# Patient Record
Sex: FEMALE | Race: BLACK OR AFRICAN AMERICAN | NOT HISPANIC OR LATINO | Employment: FULL TIME | URBAN - METROPOLITAN AREA
[De-identification: names, ages, dates, MRNs, and addresses within clinical notes are randomized per-mention and may not be internally consistent; named-entity substitution may affect disease eponyms.]

---

## 2017-02-03 ENCOUNTER — HOSPITAL ENCOUNTER (OUTPATIENT)
Dept: RADIOLOGY | Facility: HOSPITAL | Age: 42
Discharge: HOME | End: 2017-02-03
Attending: INTERNAL MEDICINE
Payer: MEDICAID

## 2017-02-03 DIAGNOSIS — Z12.31 VISIT FOR SCREENING MAMMOGRAM: ICD-10-CM

## 2017-02-03 PROCEDURE — 77067 SCR MAMMO BI INCL CAD: CPT | Mod: TC

## 2017-11-29 ENCOUNTER — HOSPITAL ENCOUNTER (EMERGENCY)
Facility: HOSPITAL | Age: 42
Discharge: HOME | End: 2017-11-30
Attending: EMERGENCY MEDICINE
Payer: MEDICAID
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
## 2017-11-29 PROCEDURE — 99283 EMERGENCY DEPT VISIT LOW MDM: CPT

## 2017-11-30 VITALS
BODY MASS INDEX: 31.64 KG/M2 | DIASTOLIC BLOOD PRESSURE: 72 MMHG | OXYGEN SATURATION: 99 % | WEIGHT: 202 LBS | TEMPERATURE: 98 F | RESPIRATION RATE: 20 BRPM | HEART RATE: 78 BPM | SYSTOLIC BLOOD PRESSURE: 118 MMHG

## 2017-11-30 PROCEDURE — 25000003 PHARM REV CODE 250: Performed by: EMERGENCY MEDICINE

## 2017-11-30 RX ORDER — CYCLOBENZAPRINE HCL 10 MG
10 TABLET ORAL
Status: COMPLETED | OUTPATIENT
Start: 2017-11-30 | End: 2017-11-30

## 2017-11-30 RX ORDER — CYCLOBENZAPRINE HCL 10 MG
10 TABLET ORAL 3 TIMES DAILY PRN
Qty: 15 TABLET | Refills: 0 | Status: SHIPPED | OUTPATIENT
Start: 2017-11-30 | End: 2017-12-05

## 2017-11-30 RX ORDER — HYDROCODONE BITARTRATE AND ACETAMINOPHEN 5; 325 MG/1; MG/1
1 TABLET ORAL
Status: COMPLETED | OUTPATIENT
Start: 2017-11-30 | End: 2017-11-30

## 2017-11-30 RX ORDER — HYDROCODONE BITARTRATE AND ACETAMINOPHEN 5; 325 MG/1; MG/1
1 TABLET ORAL EVERY 4 HOURS PRN
Qty: 18 TABLET | Refills: 0 | Status: SHIPPED | OUTPATIENT
Start: 2017-11-30 | End: 2022-06-14

## 2017-11-30 RX ADMIN — CYCLOBENZAPRINE HYDROCHLORIDE 10 MG: 10 TABLET, FILM COATED ORAL at 12:11

## 2017-11-30 RX ADMIN — HYDROCODONE BITARTRATE AND ACETAMINOPHEN 1 TABLET: 5; 325 TABLET ORAL at 12:11
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
## 2018-12-17 ENCOUNTER — HOSPITAL ENCOUNTER (OUTPATIENT)
Dept: RADIOLOGY | Facility: HOSPITAL | Age: 43
Discharge: HOME | End: 2018-12-17
Attending: NURSE PRACTITIONER
Payer: MEDICAID

## 2018-12-17 DIAGNOSIS — M54.2 CERVICALGIA: Primary | ICD-10-CM

## 2018-12-17 DIAGNOSIS — M54.2 CERVICALGIA: ICD-10-CM

## 2018-12-17 PROCEDURE — 70360 X-RAY EXAM OF NECK: CPT | Mod: TC,FY,PO

## 2020-08-09 ENCOUNTER — HOSPITAL ENCOUNTER (EMERGENCY)
Facility: HOSPITAL | Age: 45
Discharge: HOME | End: 2020-08-09
Attending: EMERGENCY MEDICINE

## 2020-08-09 VITALS
HEART RATE: 76 BPM | OXYGEN SATURATION: 98 % | DIASTOLIC BLOOD PRESSURE: 71 MMHG | TEMPERATURE: 99 F | SYSTOLIC BLOOD PRESSURE: 124 MMHG | HEIGHT: 64 IN | BODY MASS INDEX: 40.36 KG/M2 | RESPIRATION RATE: 18 BRPM | WEIGHT: 236.38 LBS

## 2020-08-09 DIAGNOSIS — M79.673 FOOT PAIN: ICD-10-CM

## 2020-08-09 DIAGNOSIS — M25.579 ANKLE PAIN: ICD-10-CM

## 2020-08-09 DIAGNOSIS — S93.401A SPRAIN OF RIGHT ANKLE, UNSPECIFIED LIGAMENT, INITIAL ENCOUNTER: Primary | ICD-10-CM

## 2020-08-09 PROCEDURE — 96372 THER/PROPH/DIAG INJ SC/IM: CPT | Mod: ER

## 2020-08-09 PROCEDURE — 99284 EMERGENCY DEPT VISIT MOD MDM: CPT | Mod: 25,ER

## 2020-08-09 PROCEDURE — 63600175 PHARM REV CODE 636 W HCPCS: Mod: ER | Performed by: PHYSICIAN ASSISTANT

## 2020-08-09 RX ORDER — KETOROLAC TROMETHAMINE 10 MG/1
10 TABLET, FILM COATED ORAL EVERY 6 HOURS
Qty: 10 TABLET | Refills: 0 | Status: SHIPPED | OUTPATIENT
Start: 2020-08-09 | End: 2022-06-14

## 2020-08-09 RX ORDER — KETOROLAC TROMETHAMINE 30 MG/ML
30 INJECTION, SOLUTION INTRAMUSCULAR; INTRAVENOUS
Status: COMPLETED | OUTPATIENT
Start: 2020-08-09 | End: 2020-08-09

## 2020-08-09 RX ADMIN — KETOROLAC TROMETHAMINE 30 MG: 30 INJECTION, SOLUTION INTRAMUSCULAR at 01:08

## 2020-08-09 NOTE — DISCHARGE INSTRUCTIONS
Your x-rays did not reveal any evidence of fracture dislocation.  Your advised to rest, elevate and ice the ankle and foot.  You are instructed to follow-up with her primary care provider for re-evaluation and to return to the emergency department immediately for any new or worsening symptoms.

## 2020-08-09 NOTE — ED PROVIDER NOTES
"Encounter Date: 2020       History     Chief Complaint   Patient presents with    Ankle Pain     Pt states "I was trying to be cute and clean up. I slipped and hurt my right ankle. It's throbbing." No meds.      45-year-old female presents to the emergency department for evaluation of acute right ankle and foot pain status post injury.  She reports that she was wearing wedge shoes when she accidentally inverted her ankle.  She reports that this happened just prior to arrival.  She reports that it is an achy, throbbing pain in her ankle and lateral aspect of her foot.  She denies any numbness, tingling, weakness or swelling to the lower extremities.  She denies any radiation of pain to her knee, hip or low back.  She reports that she did not hit her head nor lose consciousness when she fell.  No treatment was attempted prior to arrival.  She reports that her menstrual cycle started today, she denies risk of pregnancy.  She denies taking any blood thinning medications.        Review of patient's allergies indicates:   Allergen Reactions    Morphine      Past Medical History:   Diagnosis Date    Anxiety      Past Surgical History:   Procedure Laterality Date     SECTION      gastric sleeve       TUBAL LIGATION       History reviewed. No pertinent family history.  Social History     Tobacco Use    Smoking status: Never Smoker   Substance Use Topics    Alcohol use: Yes     Comment: socially    Drug use: Not on file     Review of Systems   Constitutional: Negative for activity change, appetite change and fever.   HENT: Negative for congestion, ear pain, sinus pain, sore throat, trouble swallowing and voice change.    Eyes: Negative for photophobia and visual disturbance.   Respiratory: Negative for cough, chest tightness, shortness of breath and wheezing.    Cardiovascular: Negative for chest pain.   Gastrointestinal: Negative for abdominal pain, constipation, diarrhea, nausea and vomiting. "   Genitourinary: Negative for decreased urine volume, dysuria, flank pain and frequency.   Musculoskeletal: Positive for arthralgias. Negative for back pain, joint swelling, neck pain and neck stiffness.   Neurological: Negative for dizziness, syncope, weakness, light-headedness, numbness and headaches.       Physical Exam     Initial Vitals [08/09/20 1328]   BP Pulse Resp Temp SpO2   128/73 99 19 98.5 °F (36.9 °C) 99 %      MAP       --         Physical Exam    Nursing note and vitals reviewed.  Constitutional: She appears well-developed and well-nourished. She is not diaphoretic. No distress.   HENT:   Head: Normocephalic and atraumatic.   Right Ear: External ear normal.   Left Ear: External ear normal.   Nose: Nose normal.   Mouth/Throat: Oropharynx is clear and moist.   Eyes: Conjunctivae and EOM are normal. Pupils are equal, round, and reactive to light.   Neck: Normal range of motion. Neck supple.   Cardiovascular: Normal rate, regular rhythm and normal heart sounds.   Pulmonary/Chest: Breath sounds normal. No respiratory distress. She has no wheezes. She has no rhonchi. She has no rales. She exhibits no tenderness.   Musculoskeletal:      Right knee: She exhibits normal range of motion, no swelling and no effusion. No tenderness found.        Right ankle: She exhibits normal range of motion, no swelling and no ecchymosis. Tenderness. Lateral malleolus tenderness found. No medial malleolus tenderness found.      Right lower leg: She exhibits no tenderness, no bony tenderness and no swelling.        Right foot: Tenderness and bony tenderness present. No swelling or laceration.   Lymphadenopathy:     She has no cervical adenopathy.   Neurological: She is alert and oriented to person, place, and time.   Skin: Skin is warm and dry.   Psychiatric: She has a normal mood and affect.         ED Course   Procedures  Labs Reviewed - No data to display       Imaging Results    None          Medical Decision Making:    Initial Assessment:   45-year-old female presents to the emergency department for evaluation of acute right ankle pain and foot pain status post injury.  Physical exam reveals a nontoxic-appearing female in no acute distress.  Patient is afebrile vital signs within normal limits.  Neurological exam reveals an alert and oriented patient.  Lungs clear to auscultation bilaterally.  Examination of the right lower extremity reveals tenderness to palpation over the lateral malleolus and lateral aspect of the foot.  No erythema, edema or ecchymosis noted.  No bony instability or crepitus noted.  Full range of motion, sensation and peripheral pulses intact in lower extremities bilaterally.  Differential Diagnosis:   X-rays ordered to assess possible osseous injury including fracture or dislocation  Ankle sprain  Foot sprain  ED Management:  Patient declined UPT.  Discussed the risks of x-ray imaging and NSAID use in pregnancy, patient verbalizes understanding and acceptance of risks.  Patient given Toradol for pain control.  X-ray of the foot reveals no acute findings.  However unable to fully evaluate the lateral malleolus.  X-ray of the ankle ordered.  X-ray report of the ankle reveals no acute findings.  Patient placed in Ace wrap and crutches upon discharge.  Instructed the patient to follow up with her primary care provider and advised rice therapy.  Instructed the patient to return to the emergency department immediately for any new or worsening symptoms.  Discussed this patient with Dr. Chance who is in agreement course of treatment.                                 Clinical Impression:       ICD-10-CM ICD-9-CM   1. Sprain of right ankle, unspecified ligament, initial encounter  S93.401A 845.00   2. Foot pain  M79.673 729.5   3. Ankle pain  M25.579 719.47                                Ibis Taylor PA-C  08/09/20 1456

## 2022-07-02 DIAGNOSIS — Z12.11 COLON CANCER SCREENING: Primary | ICD-10-CM

## 2022-07-05 ENCOUNTER — HOSPITAL ENCOUNTER (OUTPATIENT)
Dept: PREADMISSION TESTING | Facility: HOSPITAL | Age: 47
Discharge: HOME | End: 2022-07-05
Attending: STUDENT IN AN ORGANIZED HEALTH CARE EDUCATION/TRAINING PROGRAM
Payer: COMMERCIAL

## 2022-07-05 ENCOUNTER — PATIENT MESSAGE (OUTPATIENT)
Dept: PREADMISSION TESTING | Facility: HOSPITAL | Age: 47
End: 2022-07-05

## 2022-07-05 DIAGNOSIS — Z12.11 ENCOUNTER FOR SCREENING COLONOSCOPY: Primary | ICD-10-CM

## 2022-07-05 RX ORDER — POLYETHYLENE GLYCOL 3350, SODIUM SULFATE ANHYDROUS, SODIUM BICARBONATE, SODIUM CHLORIDE, POTASSIUM CHLORIDE 236; 22.74; 6.74; 5.86; 2.97 G/4L; G/4L; G/4L; G/4L; G/4L
4 POWDER, FOR SOLUTION ORAL ONCE
Qty: 4000 ML | Refills: 0 | Status: SHIPPED | OUTPATIENT
Start: 2022-07-05 | End: 2022-07-05

## 2022-07-05 RX ORDER — SODIUM, POTASSIUM,MAG SULFATES 17.5-3.13G
1 SOLUTION, RECONSTITUTED, ORAL ORAL DAILY
Qty: 1 KIT | Refills: 0 | Status: CANCELLED | OUTPATIENT
Start: 2022-07-05 | End: 2022-07-07

## 2022-07-11 ENCOUNTER — TELEPHONE (OUTPATIENT)
Dept: UROGYNECOLOGY | Facility: CLINIC | Age: 47
End: 2022-07-11

## 2022-07-11 NOTE — TELEPHONE ENCOUNTER
----- Message from Abimael Willoughby PA-C sent at 7/11/2022  3:40 PM CDT -----  Regarding: Possible need to scrub off schedule  Hey, looks like patient not coming in for urogyn issue. She saw gen OBGYN last month for fibroids and bleeding. Can you please call to see if she is coming for Urogyn issue or gen OBGYN? If no urogyn issue, please refer back to GYN as I am not doing general OBGYN.     Thanks,   Abimael

## 2023-04-03 ENCOUNTER — TELEPHONE (OUTPATIENT)
Dept: PSYCHIATRY | Facility: CLINIC | Age: 48
End: 2023-04-03
Payer: COMMERCIAL

## 2023-04-03 NOTE — TELEPHONE ENCOUNTER
----- Message from Lupe Murphy sent at 4/3/2023  2:08 PM CDT -----  Contact: Self  Patient is calling in to schedule appointment. Please call back [phone]

## 2025-03-24 ENCOUNTER — OFFICE VISIT (OUTPATIENT)
Dept: OBSTETRICS AND GYNECOLOGY | Facility: CLINIC | Age: 50
End: 2025-03-24
Payer: COMMERCIAL

## 2025-03-24 VITALS
DIASTOLIC BLOOD PRESSURE: 81 MMHG | WEIGHT: 202.63 LBS | SYSTOLIC BLOOD PRESSURE: 121 MMHG | BODY MASS INDEX: 31.73 KG/M2

## 2025-03-24 DIAGNOSIS — N89.8 VAGINAL ODOR: ICD-10-CM

## 2025-03-24 DIAGNOSIS — R00.1 BRADYCARDIA: Primary | ICD-10-CM

## 2025-03-24 DIAGNOSIS — Z01.419 WELL WOMAN EXAM WITH ROUTINE GYNECOLOGICAL EXAM: Primary | ICD-10-CM

## 2025-03-24 PROCEDURE — 99999 PR PBB SHADOW E&M-EST. PATIENT-LVL III: CPT | Mod: PBBFAC,,,

## 2025-03-24 PROCEDURE — 99386 PREV VISIT NEW AGE 40-64: CPT | Mod: S$GLB,,,

## 2025-03-24 PROCEDURE — 81515 NFCT DS BV&VAGINITIS DNA ALG: CPT

## 2025-03-24 PROCEDURE — 3008F BODY MASS INDEX DOCD: CPT | Mod: CPTII,S$GLB,,

## 2025-03-24 PROCEDURE — 1160F RVW MEDS BY RX/DR IN RCRD: CPT | Mod: CPTII,S$GLB,,

## 2025-03-24 PROCEDURE — 1159F MED LIST DOCD IN RCRD: CPT | Mod: CPTII,S$GLB,,

## 2025-03-24 PROCEDURE — 3079F DIAST BP 80-89 MM HG: CPT | Mod: CPTII,S$GLB,,

## 2025-03-24 PROCEDURE — 3074F SYST BP LT 130 MM HG: CPT | Mod: CPTII,S$GLB,,

## 2025-03-24 PROCEDURE — 87591 N.GONORRHOEAE DNA AMP PROB: CPT

## 2025-03-24 RX ORDER — ATOMOXETINE 25 MG/1
25 CAPSULE ORAL DAILY
COMMUNITY

## 2025-03-24 NOTE — PROGRESS NOTES
SUBJECTIVE:   49 y.o. female  presents for annual well woman exam.   Patient's last menstrual period was 2022.. History of robotic laparoscopic total hysterectomy with bilateral salpingectomy in 2022. Reports vaginal odor x few months. She regularly using vaginal  and uses body wash directly to vagina. She takes a lot of bubble baths with different soaps. States that her sense of smell has been altered since COVID, so she is not entirely sure what she is smelling. Is currently sexually active with males. Would like STD testing without blood work-reports recent negative HIV/syphilis.   Denies any abnormal bleeding, vaginal pain, discharge, itching, irritation.  Denies any breast, urinary, or bowel complaints.   She reports that ever since her hysterectomy, that the TSA body scanner always detects an abnormality in her pelvis.    denies domestic violence. She travels frequently for work.     - tobacco + social alcohol -drugs  Not exercising much      Pap 2022-normal, not indicated- s/p robotic laparoscopic hysterectomy  MMG 2025-normal  Colonoscopy-discuss with PCP             Past Medical History:   Diagnosis Date    Anemia     Anxiety     Fibrocystic breast disease     Fibroids     Hypertension      Past Surgical History:   Procedure Laterality Date     SECTION      gastric sleeve   2010    HYSTERECTOMY      Iron Infusion      ROBOTIC ASSISTED HYSTERECTOMY Bilateral 2022    RALH/BS    SALPINGECTOMY Bilateral 2022    TUBAL LIGATION       Social History[1]  Family History   Problem Relation Name Age of Onset    Diabetes Maternal Grandmother      Cirrhosis Maternal Grandfather      Lung cancer Father      Bipolar disorder Mother      Fibroids Mother       OB History    Para Term  AB Living   3 3 2 1  3   SAB IAB Ectopic Multiple Live Births       3      # Outcome Date GA Lbr Mika/2nd Weight Sex Type Anes PTL Lv   3 Term    3.685 kg (8 lb 2 oz) F  CS-LTranv EPI  ALAN   2 Term    3.6 kg (7 lb 15 oz) M CS-LTranv EPI  ALAN   1     2.92 kg (6 lb 7 oz) F CS-LTranv EPI N ALAN      Complications: PIH (pregnancy induced hypertension)         Current Medications[2]  Allergies: Morphine     ROS:  Constitutional: no weight loss, weight gain, fever, fatigue  Eyes:  No vision changes, glasses/contacts  ENT/Mouth: No ulcers, sinus problems, ears ringing, headache  Cardiovascular: No inability to lie flat, chest pain, exercise intolerance, swelling, heart palpitations  Respiratory: No wheezing, coughing blood, shortness of breath, or cough  Gastrointestinal: No diarrhea, bloody stool, nausea/vomiting, constipation, gas, hemorrhoids  Genitourinary: See HPI  Musculoskeletal: No muscle weakness  Skin/Breast: No painful breasts, nipple discharge, masses, rash, ulcers  Neurological: No passing out, seizures, numbness, headache  Endocrine: No hot flashes, hair loss, abnormal hair growth, ance  Psychiatric: No depression, crying  Hematologic: No bruises, bleeding, swollen lymph nodes, anemia.      OBJECTIVE:   The patient appears well, alert, oriented x 3, in no distress.  /81 (Patient Position: Sitting)   Wt 91.9 kg (202 lb 9.6 oz)   LMP 2022   BMI 31.73 kg/m²   NECK: no thyromegaly, trachea midline  SKIN: no acne, striae, hirsutism  BREAST EXAM: breasts appear normal, no suspicious masses, no skin or nipple changes or axillary nodes  ABDOMEN: no hernias, masses, or hepatosplenomegaly  GENITALIA: normal external genitalia, no erythema, no discharge  URETHRA: normal urethra, normal urethral meatus  VAGINA: vaginal discharge white, thin, and thick  CERVIX: cervix absent   UTERUS: uterus absent  ADNEXA: no mass, fullness, tenderness      ASSESSMENT:   Claudia was seen today for annual exam.    Diagnoses and all orders for this visit:    Well woman exam with routine gynecological exam    Vaginal odor  -     C. trachomatis/N. gonorrhoeae by AMP DNA  -      Vaginosis Screen by DNA Probe        Orders Placed This Encounter   Procedures    C. trachomatis/N. gonorrhoeae by AMP DNA    Vaginosis Screen by DNA Probe     Discussed:   a. Avoid feminine products such as deoderant soaps, body wash, bubble bath, douches, scented toilet paper, deoderant tampons or pads, feminine wipes, chronic pad use.  b. Avoid other vulvovaginal irritants such as long hot baths, humidity, tight, synthetic clothing, chlorine and sitting around in wet bathing suits  c. Wear cotton underwear, avoid thong underwear and no underwear to bed  d. Take showers instead of baths and use a hair dryer on cool setting afterwards to dry  e. Wear cotton to exercise and shower immediately after exercise and change clothes  f. Use condoms without spermicide if sexually active and symptoms are triggered by intercourse    Offered pelvic US-patient declined   Follow up in 1 year for annual exam or as needed.  Olamide Whitney PA-C         [1]   Social History  Socioeconomic History    Marital status:    Tobacco Use    Smoking status: Never    Smokeless tobacco: Never   Substance and Sexual Activity    Alcohol use: Yes     Comment: socially    Drug use: Not Currently    Sexual activity: Yes     Partners: Male     Birth control/protection: See Surgical Hx   [2]   Current Outpatient Medications   Medication Sig Dispense Refill    atomoxetine (STRATTERA) 25 MG capsule Take 25 mg by mouth once daily.      cyanocobalamin 1,000 mcg/mL injection       famotidine (PEPCID) 20 MG tablet Take 1 tablet (20 mg total) by mouth 2 (two) times daily. (Patient not taking: Reported on 9/23/2022) 60 tablet 11    ferrous sulfate (FEOSOL) 325 mg (65 mg iron) Tab tablet Take by mouth once daily. (Patient not taking: Reported on 3/24/2025)       No current facility-administered medications for this visit.

## 2025-03-25 ENCOUNTER — OFFICE VISIT (OUTPATIENT)
Dept: CARDIOLOGY | Facility: CLINIC | Age: 50
End: 2025-03-25
Payer: COMMERCIAL

## 2025-03-25 VITALS
WEIGHT: 198 LBS | BODY MASS INDEX: 31.08 KG/M2 | HEIGHT: 67 IN | DIASTOLIC BLOOD PRESSURE: 89 MMHG | SYSTOLIC BLOOD PRESSURE: 125 MMHG | HEART RATE: 85 BPM

## 2025-03-25 DIAGNOSIS — R00.1 BRADYCARDIA: Primary | ICD-10-CM

## 2025-03-25 PROCEDURE — 99999 PR PBB SHADOW E&M-EST. PATIENT-LVL III: CPT | Mod: PBBFAC,,, | Performed by: STUDENT IN AN ORGANIZED HEALTH CARE EDUCATION/TRAINING PROGRAM

## 2025-03-25 NOTE — PROGRESS NOTES
"  Cardiology Clinic Visit    History of Present Illness:       Claudia Stewart is a pleasant 49 y.o. female with PMHx here for bradycardia. ECG reviewed NSR but she endorsed when she goes to her PCP or other doctor HR in 55-59 bpm and she is very concerned. Asymptomatic. Labs reviewed from [facility]. Denies cp, sob, palpitations, presyncope/dizziness, syncope, orthopnea, PND, bendopnea, decrease ET, NVDC, fever, chills.        History obtained by patient interview and supplemented by nursing documentation and chart review.   PMHx:  has a past medical history of Anemia, Anxiety, Fibrocystic breast disease, Fibroids, and Hypertension.   SurgHx:  has a past surgical history that includes gastric sleeve  ();  section; Tubal ligation; Iron Infusion; Hysterectomy; Robotic assisted hysterectomy (Bilateral, 2022); and Salpingectomy (Bilateral, 2022).   FamHx: family history includes Bipolar disorder in her mother; Cirrhosis in her maternal grandfather; Diabetes in her maternal grandmother; Fibroids in her mother; Lung cancer in her father.   SocialHx:  reports that she has never smoked. She has never used smokeless tobacco. She reports current alcohol use. She reports that she does not currently use drugs.  Home Meds:  Current Outpatient Medications   Medication Instructions    atomoxetine (STRATTERA) 25 mg, Daily    cyanocobalamin 1,000 mcg/mL injection No dose, route, or frequency recorded.    famotidine (PEPCID) 20 mg, Oral, 2 times daily    ferrous sulfate (FEOSOL) 325 mg (65 mg iron) Tab tablet Oral, Daily       Review of Systems: Comprehensive ROS was performed and is negative unless otherwise noted in HPI.   Objective   Objective/Exam:   /89 (BP Location: Left arm, Patient Position: Sitting)   Pulse 85   Ht 5' 7" (1.702 m)   Wt 89.8 kg (198 lb)   LMP 2022   BMI 31.01 kg/m²    Wt Readings from Last 4 Encounters:   25 89.8 kg (198 lb)   25 91.9 kg (202 lb 9.6 oz) " "  09/23/22 7.711 kg (17 lb)   09/08/22 89.4 kg (197 lb)      Constitutional: NAD, Atraumatic, Conversant   HEENT: MMM, Sclera anicteric, No JVD   Cardiovascular: RRR, no murmurs noted, no chest tenderness to palpation, 2+ radial pulses b/l  Pulmonary: normal respiratory effort, CTAB, no crackles, wheezes  Abdominal: S/NT/ND  Musculoskeletal: No lower extremity edema noted b/l  Neurological: No gross neurological deficits  Skin: W/D/I  Psych: Appropriate affect, normal mood  Labs/Imaging/Procedures   Personally reviewed  Lab Results   Component Value Date     09/15/2022     02/03/2017    K 3.5 09/15/2022    K 4.2 02/03/2017     02/03/2017    CO2 31 09/15/2022    CO2 30 (H) 02/03/2017    BUN 4 (L) 09/15/2022    BUN 11 02/03/2017    CREATININE 0.79 09/15/2022    CREATININE 0.76 02/03/2017    ANIONGAP 8 09/15/2022    ANIONGAP 11 02/03/2017     No results found for: "HGBA1C"  No results found for: "BNP", "BNPTRIAGEBLO"   Lab Results   Component Value Date    WBC 3.75 (L) 02/03/2017    HGB 12.3 12/17/2018    HCT 38.6 12/17/2018     02/03/2017    GRAN 2.3 02/03/2017    GRAN 60.6 02/03/2017     Lab Results   Component Value Date    CHOL 167 02/03/2017    HDL 69 02/03/2017    LDLCALC 85.4 02/03/2017    TRIG 63 02/03/2017     Lab Results   Component Value Date    TSH 0.655 02/03/2017     No results found for: "APOLIPOPROTE"  No results found for: "LIPOA"     TTE:  No results found for this or any previous visit.    Stress Testing:   No results found for this or any previous visit.     Coronary Angiogram:  No results found for this or any previous visit.      -Reviewing Medical records & lab results  -Independently reviewing and interpreting (if not documented by myself) EKGs, echocardiograms, catherizations   -Obtaining a history, performing a relevant exam, counseling/educating the patient/family  -Documenting clinical information in the EMR including ordering of tests  -Care coordination and " communicating with other health care providers       Problem List:     1. Bradycardia      Assessment/Plan:     Asymptomatic Bradycardia- repeat ecg NSR (), labs reviewed including TSH normal, asymptomatic. Will plan for Echo and HR.     Preventative Care:  Lipids:  PVD(V/A)      Patient expressed verbal understanding and agreed with the plan     Return sooner for concerns or questions. If symptoms persist go to the ED.  I have reviewed all pertinent data including patient's medical history in detail and updated the computerized patient record.   Total time spent counseling greater than fifty percent of total visit time.  Counseling included discussion regarding imaging findings, diagnosis, possibilities, treatment options, risks and benefits.      Thank you for the opportunity to care for this patient. Please don't hesitate to reach out with any questions/concerns         Ulises Ferrera MD  Cardiovascular Disease; Interventional Cardiology  [facility]

## 2025-03-28 ENCOUNTER — RESULTS FOLLOW-UP (OUTPATIENT)
Dept: OBSTETRICS AND GYNECOLOGY | Facility: CLINIC | Age: 50
End: 2025-03-28

## 2025-03-28 LAB
BACTERIAL VAGINOSIS DNA (OHS): DETECTED
C TRACH DNA SPEC QL NAA+PROBE: NOT DETECTED
CANDIDA GLABRATA/KRUSEI DNA (OHS): NOT DETECTED
CANDIDA SPECIES DNA (OHS): NOT DETECTED
CTGC SOURCE (OHS) ORD-325: NORMAL
N GONORRHOEA DNA UR QL NAA+PROBE: NOT DETECTED
TRICHOMONAS VAGINALIS DNA (OHS): NOT DETECTED

## 2025-03-28 RX ORDER — METRONIDAZOLE 500 MG/1
500 TABLET ORAL EVERY 12 HOURS
Qty: 14 TABLET | Refills: 0 | Status: SHIPPED | OUTPATIENT
Start: 2025-03-28

## 2025-05-09 ENCOUNTER — PATIENT MESSAGE (OUTPATIENT)
Dept: CARDIOLOGY | Facility: CLINIC | Age: 50
End: 2025-05-09
Payer: COMMERCIAL

## 2025-07-30 ENCOUNTER — TELEPHONE (OUTPATIENT)
Dept: CARDIOLOGY | Facility: CLINIC | Age: 50
End: 2025-07-30
Payer: COMMERCIAL

## 2025-08-29 ENCOUNTER — TELEPHONE (OUTPATIENT)
Dept: CARDIOLOGY | Facility: CLINIC | Age: 50
End: 2025-08-29
Payer: COMMERCIAL